# Patient Record
Sex: FEMALE | Race: BLACK OR AFRICAN AMERICAN
[De-identification: names, ages, dates, MRNs, and addresses within clinical notes are randomized per-mention and may not be internally consistent; named-entity substitution may affect disease eponyms.]

---

## 2018-01-18 ENCOUNTER — HOSPITAL ENCOUNTER (EMERGENCY)
Dept: HOSPITAL 74 - FER | Age: 23
Discharge: HOME | End: 2018-01-18
Payer: SELF-PAY

## 2018-01-18 VITALS — DIASTOLIC BLOOD PRESSURE: 86 MMHG | HEART RATE: 89 BPM | TEMPERATURE: 98.8 F | SYSTOLIC BLOOD PRESSURE: 130 MMHG

## 2018-01-18 VITALS — BODY MASS INDEX: 29 KG/M2

## 2018-01-18 DIAGNOSIS — N94.6: Primary | ICD-10-CM

## 2018-01-18 LAB
COLOR UR: YELLOW
EPITH CASTS URNS QL MICRO: (no result) /HPF
PH UR: 5.5 [PH] (ref 4.5–8)
RBC # BLD AUTO: (no result) /HPF (ref 0–3)
RBC # UR STRIP: (no result) /UL
SP GR UR: 1.02 (ref 1–1.02)
UROBILINOGEN UR STRIP-MCNC: 0.2 MG/DL (ref 0.2–1)
WBC # UR AUTO: (no result) /UL (ref 0–5)

## 2018-01-18 NOTE — PDOC
History of Present Illness





- General


Chief Complaint: Pain


Stated Complaint: ABD CRAMP PERIOD LATE


Time Seen by Provider: 01/18/18 12:23


History Source: Patient (Patient walked in stating she was sexually active 

without protection aprox 2 weeks ago  (same partner x 2 years) now she is late 

with her period by a couple of days)


Exam Limitations: No Limitations





- History of Present Illness


Severity: mild


Associated Symptoms: denies: denies symptoms, chest pain, cough, diaphoresis, 

fever/chills, headaches, loss of appetite, malaise, nausea/vomiting, rash, 

seizure, shortness of breath, syncope, weakness, other





Past History





- Travel


Traveled outside of the country in the last 30 days: No


Close contact w/someone who was outside of country & ill: No





- Past Medical History


Allergies/Adverse Reactions: 


 Allergies











Allergy/AdvReac Type Severity Reaction Status Date / Time


 


No Known Allergies Allergy   Verified 01/18/18 12:26











Home Medications: 


Ambulatory Orders





NK [No Known Home Medication]  01/18/18 











- Surgical History


Other Surgical History: 


Denies any surgery history





**Review of Systems





- Review of Systems


Able to Perform ROS?: Yes


Is the patient limited English proficient: Yes


Constitutional: No: Symptoms Reported, See HPI, Chills, Diaphoresis, Fever, 

Loss of Appetite, Malaise, Night Sweats, Weakness, Weight Stable, Unintentional 

Wgt. Loss, Unexplained wgt Loss, Other


HEENTM: No: Symptoms Reported, See HPI, Eye Pain, Blurred Vision, Tearing, 

Recent change in vision, Double Vision, Cataracts, Ear Pain, Ocular Prothesis, 

Ear Discharge, Nose Pain, Nose Congestion, Tinnitus, Nose Bleeding, Hearing Loss

, Throat Pain, Throat Swelling, Mouth Pain, Dental Problems, Difficulty 

Swallowing, Mouth Swelling, Other


Respiratory: No: Symptoms reported, See HPI, Cough, Orthopnea, Shortness of 

Breath, SOB with Exertion, SOB at Rest, Stridor, Wheezing, Productive cough, 

Hemoptysis, Other


Cardiac (ROS): No: Symptoms Reported, See HPI, Chest Pain, Edema, Irregular 

Heart Rate, Lightheadedness, Palpitations, Syncope, Chest Tightness, Other


ABD/GI: Yes: See HPI


Psychiatric: Yes: Anxiety


All Other Systems: Reviewed and Negative





*Physical Exam





- Physical Exam


General Appearance: Yes: Nourished, Appropriately Dressed.  No: Apparent 

Distress (Patient seems quite comfortable, anxious about being pregnant.)


HEENT: positive: LINDSEY


Neck: positive: Supple


Respiratory/Chest: positive: Normal Breath Sounds


Cardiovascular: positive: Regular Rate


Gastrointestinal/Abdominal: positive: Normal Bowel Sounds.  negative: Tender


Extremity: positive: Normal Capillary Refill


Integumentary: positive: Normal Color


Neurologic: positive: Fully Oriented, Alert, Normal Mood/Affect





Medical Decision Making





- Medical Decision Making


Discussed with patient about follow up with own Gyn MD if symptoms persist 


01/20/18 19:12








*DC/Admit/Observation/Transfer


Diagnosis at time of Disposition: 


 Dysmenorrhea








- Discharge Dispostion


Disposition: HOME


Condition at time of disposition: Stable


Admit: No





- Referrals





- Patient Instructions


Printed Discharge Instructions:  DI for Dysmenorrhea


Additional Instructions: 


Tylenol, Motrin for pain


Follow up at your Gyn  MD next week if symptoms persist





- Post Discharge Activity


Forms/Work/School Notes:  Back to Work

## 2018-02-07 ENCOUNTER — HOSPITAL ENCOUNTER (EMERGENCY)
Dept: HOSPITAL 74 - JER | Age: 23
Discharge: HOME | End: 2018-02-07
Payer: SELF-PAY

## 2018-02-07 VITALS — SYSTOLIC BLOOD PRESSURE: 123 MMHG | HEART RATE: 78 BPM | TEMPERATURE: 98.2 F | DIASTOLIC BLOOD PRESSURE: 77 MMHG

## 2018-02-07 VITALS — BODY MASS INDEX: 28.3 KG/M2

## 2018-02-07 DIAGNOSIS — B37.3: Primary | ICD-10-CM

## 2018-02-07 LAB
ALBUMIN SERPL-MCNC: 4 G/DL (ref 3.4–5)
ALP SERPL-CCNC: 97 U/L (ref 45–117)
ALT SERPL-CCNC: 17 U/L (ref 12–78)
ANION GAP SERPL CALC-SCNC: 8 MMOL/L (ref 8–16)
APPEARANCE UR: CLEAR
AST SERPL-CCNC: 11 U/L (ref 15–37)
BACTERIA #/AREA URNS HPF: (no result) /HPF
BASOPHILS # BLD: 0.6 % (ref 0–2)
BILIRUB SERPL-MCNC: 0.5 MG/DL (ref 0.2–1)
BILIRUB UR STRIP.AUTO-MCNC: NEGATIVE MG/DL
BUN SERPL-MCNC: 15 MG/DL (ref 7–18)
CALCIUM SERPL-MCNC: 9.5 MG/DL (ref 8.5–10.1)
CHLORIDE SERPL-SCNC: 106 MMOL/L (ref 98–107)
CO2 SERPL-SCNC: 29 MMOL/L (ref 21–32)
COLOR UR: (no result)
CREAT SERPL-MCNC: 0.7 MG/DL (ref 0.55–1.02)
DEPRECATED RDW RBC AUTO: 13.1 % (ref 11.6–15.6)
EOSINOPHIL # BLD: 2 % (ref 0–4.5)
EPITH CASTS URNS QL MICRO: (no result) /HPF
GLUCOSE SERPL-MCNC: 81 MG/DL (ref 74–106)
HCG UR QL: NEGATIVE
HCT VFR BLD CALC: 42.1 % (ref 32.4–45.2)
HGB BLD-MCNC: 14 GM/DL (ref 10.7–15.3)
KETONES UR QL STRIP: NEGATIVE
LEUKOCYTE ESTERASE UR QL STRIP.AUTO: NEGATIVE
LIPASE SERPL-CCNC: 147 U/L (ref 73–393)
LYMPHOCYTES # BLD: 23.8 % (ref 8–40)
MCH RBC QN AUTO: 26.7 PG (ref 25.7–33.7)
MCHC RBC AUTO-ENTMCNC: 33.2 G/DL (ref 32–36)
MCV RBC: 80.3 FL (ref 80–96)
MONOCYTES # BLD AUTO: 8 % (ref 3.8–10.2)
MUCOUS THREADS URNS QL MICRO: (no result)
NEUTROPHILS # BLD: 65.6 % (ref 42.8–82.8)
NITRITE UR QL STRIP: NEGATIVE
PH UR: 6 [PH] (ref 5–8)
PLATELET # BLD AUTO: 280 K/MM3 (ref 134–434)
PMV BLD: 8.3 FL (ref 7.5–11.1)
POTASSIUM SERPLBLD-SCNC: 4.1 MMOL/L (ref 3.5–5.1)
PROT SERPL-MCNC: 7.8 G/DL (ref 6.4–8.2)
PROT UR QL STRIP: NEGATIVE
PROT UR QL STRIP: NEGATIVE
RBC # BLD AUTO: 5.24 M/MM3 (ref 3.6–5.2)
RBC # UR STRIP: (no result) /UL
SODIUM SERPL-SCNC: 143 MMOL/L (ref 136–145)
SP GR UR: 1.02 (ref 1–1.03)
UROBILINOGEN UR STRIP-MCNC: NEGATIVE MG/DL (ref 0.2–1)
WBC # BLD AUTO: 8.4 K/MM3 (ref 4–10)

## 2018-02-07 PROCEDURE — 3E0337Z INTRODUCTION OF ELECTROLYTIC AND WATER BALANCE SUBSTANCE INTO PERIPHERAL VEIN, PERCUTANEOUS APPROACH: ICD-10-PCS | Performed by: EMERGENCY MEDICINE

## 2018-02-07 PROCEDURE — 3E0333Z INTRODUCTION OF ANTI-INFLAMMATORY INTO PERIPHERAL VEIN, PERCUTANEOUS APPROACH: ICD-10-PCS | Performed by: EMERGENCY MEDICINE

## 2018-02-07 NOTE — PDOC
History of Present Illness





- General


Chief Complaint: Pain


Stated Complaint: PAIN/ ABD, EAR


Time Seen by Provider: 02/07/18 11:12





- History of Present Illness


Initial Comments: 





02/07/18 12:13


"The patient is a 22 year old female, with no significant past medical history, 

who presents to the emergency department with suprapubic pain since early 

yesterday morning with new onset of nausea and diarrhea this morning. The 

patient reports her pain as constant, non radiating, localized to the area just 

below her umbilicus. She also reports some non odorous vaginal discharge. She 

states that this feels exactly like a previous yeast infection. She reports the 

last time she had sexual intercourse was 1/18/18 and denies use of condoms.


Pt also reports having her ears pierced a week ago and states the left ear 

piercing feels hot, tender and swollen. 





LMP: 1/23/18





The patient denies chest pain, shortness of breath, headache and dizziness. 


The patient denies fever, chills, vomit and constipation. 


The patient denies dysuria, frequency, urgency and hematuria. 





Allergies: NKDA


"





Past History





- Past Medical History


Allergies/Adverse Reactions: 


 Allergies











Allergy/AdvReac Type Severity Reaction Status Date / Time


 


No Known Allergies Allergy   Verified 02/07/18 09:27











Home Medications: 


Ambulatory Orders





Cephalexin [Keflex] 500 mg PO BID #10 capsule 02/07/18 








COPD: No





- Suicide/Smoking/Psychosocial Hx


Smoking History: Never smoked


Have you smoked in the past 12 months: No


Information on smoking cessation initiated: No


Hx Alcohol Use: No


Drug/Substance Use Hx: No


Substance Use Type: None





**Review of Systems





- Review of Systems


Comments:: 





02/07/18 12:16


"""GENERAL/CONSTITUTIONAL: No fever or chills. No weakness.


HEAD, EYES, EARS, NOSE AND THROAT: No change in vision. No ear pain or 

discharge. No sore throat.


CARDIOVASCULAR: No chest pain or shortness of breath.


RESPIRATORY: No cough, wheezing, or hemoptysis.


GASTROINTESTINAL: (+) suprapubic pain, diarrhea and nausea, No vomiting or 

constipation.


GENITOURINARY: (+) vaginal discharge. No dysuria, frequency, or change in 

urination.


MUSCULOSKELETAL: No joint or muscle swelling or pain. No neck or back pain.


SKIN: No rash


NEUROLOGIC: No headache, vertigo, loss of consciousness, or change in strength/

sensation.


ENDOCRINE: No increased thirst. No abnormal weight change.


HEMATOLOGIC/LYMPHATIC: No anemia, easy bleeding, or history of blood clots.


ALLERGIC/IMMUNOLOGIC: No hives or skin allergy.


"""





*Physical Exam





- Vital Signs


 Last Vital Signs











Temp Pulse Resp BP Pulse Ox


 


 98.5 F   98 H  17   118/74   98 


 


 02/07/18 09:25  02/07/18 09:25  02/07/18 09:25  02/07/18 09:25  02/07/18 09:25














- Physical Exam


Comments: 





02/07/18 12:17


"GENERAL: Awake, alert, and fully oriented, in no acute distress


HEAD: No signs of trauma


EYES: PERRLA, EOMI, sclera anicteric, conjunctiva clear


ENT: + L earlobe with mild erythema and tenderness, hearing grossly normal, 

nares patent, oropharynx clear without exudates. Moist mucosa


NECK: Nontender, no stepoffs, Normal ROM, supple, no lymphadenopathy, JVD, or 

masses


LUNGS: Breath sounds equal, clear to auscultation bilaterally.  No wheezes, and 

no crackles


HEART: Regular rate and rhythm, normal S1 and S2, no murmurs, rubs or gallops


ABDOMEN: + mild periumbilical tenderness.  no lower quadrant tenderness. No 

guarding, no rebound.  No masses


EXTREMITIES: Normal range of motion, no edema.  No clubbing or cyanosis. No 

cords, erythema, or tenderness


NEUROLOGICAL: Cranial nerves II through XII intact. 5/5 strength and sensation 

in all extremities, Normal speech, normal gait


SKIN: Warm, Dry, normal turgor, no rashes or lesions noted.


: + thick white discharge clumping to vaginal walls, no CMT, no adnexal 

masses or tenderness








ED Treatment Course





- LABORATORY


CBC & Chemistry Diagram: 


 02/07/18 11:40





 02/07/18 11:40





- ADDITIONAL ORDERS


Additional order review: 


 Laboratory  Results











  02/07/18





  11:05


 


Urine Color  Ltyellow


 


Urine Appearance  Clear


 


Urine pH  6.0


 


Ur Specific Gravity  1.017


 


Urine Protein  Negative


 


Urine Glucose (UA)  Negative


 


Urine Ketones  Negative


 


Urine Blood  1+ H


 


Urine Nitrite  Negative


 


Urine Bilirubin  Negative


 


Urine Urobilinogen  Negative


 


Ur Leukocyte Esterase  Negative


 


Urine WBC (Auto)  <1


 


Urine RBC (Auto)  2


 


Ur Epithelial Cells  Rare


 


Urine Bacteria  Rare


 


Urine Mucus  Rare


 


Urine HCG, Qual  Negative








 











  02/07/18





  11:40


 


RBC  5.24 H


 


MCV  80.3


 


MCHC  33.2


 


RDW  13.1


 


MPV  8.3


 


Neutrophils %  65.6


 


Lymphocytes %  23.8


 


Monocytes %  8.0


 


Eosinophils %  2.0


 


Basophils %  0.6














- Medications


Given in the ED: 


ED Medications














Discontinued Medications














Generic Name Dose Route Start Last Admin





  Trade Name Fresoren  PRN Reason Stop Dose Admin


 


Ketorolac Tromethamine  15 mg  02/07/18 11:52  02/07/18 12:08





  Toradol Injection -  IVPUSH  02/07/18 11:53  15 mg





  ONCE ONE   Administration














Medical Decision Making





- Medical Decision Making





02/07/18 12:20


22 F with suprapubic pain and diarrhea, found to have thick white vaginal 

discharge consistent with candidiasis. Pt with no RLQ or LLQ tenderness to 

suggest ovarian pathology. No CMT to suggest PID.


Pt also with mild cellulitis of L earlobe 2/2 piercing. Piercing removed with 

no purulent drainage or fluctuance.


- Labs, UA, UCx, UPT


- Fluconazole for yeast infection


- Keflex for cellulitis of L earlobe





02/07/18 12:56


Labs, UA unremarkable, UPT negative


Pt tx'ed for vaginal candidiasis


Will receive course of keflex for cellulitis.





*DC/Admit/Observation/Transfer


Diagnosis at time of Disposition: 


 Vaginal candidiasis








- Discharge Dispostion


Disposition: HOME





- Prescriptions


Prescriptions: 


Cephalexin [Keflex] 500 mg PO BID #10 capsule





- Referrals





- Patient Instructions


Printed Discharge Instructions:  DI for Vaginal Yeast Infection


Additional Instructions: 


Take the Keflex as prescribed to treat the infection in your earlobe.


You received treatment for your yeast infection, and it should begin to clear 

up within a few days.


If you experience worsening pain, discharge, fevers, or any other concerning 

symptoms, return to the ER immediately.


Otherwise, follow up with your primary doctor within 1 week for a re-evaluation.





- Post Discharge Activity